# Patient Record
Sex: FEMALE | Race: WHITE
[De-identification: names, ages, dates, MRNs, and addresses within clinical notes are randomized per-mention and may not be internally consistent; named-entity substitution may affect disease eponyms.]

---

## 2022-07-09 ENCOUNTER — HOSPITAL ENCOUNTER (OUTPATIENT)
Dept: HOSPITAL 76 - DI.S | Age: 82
Discharge: HOME | End: 2022-07-09
Attending: EMERGENCY MEDICINE
Payer: MEDICARE

## 2022-07-09 DIAGNOSIS — M54.59: Primary | ICD-10-CM

## 2022-07-09 NOTE — XRAY REPORT
PROCEDURE:  Sacrum/Coccyx

 

INDICATIONS:  LOW BACK PAIN

 

TECHNIQUE:  3 views of the sacrum and coccyx acquired.  

 

COMPARISON:  None

 

FINDINGS:  

 

Bones:  No fractures or dislocations.  No suspicious bony lesions.  

 

Soft tissues:  Visualized bowel gas pattern is normal.  No suspicious soft tissue densities.  Moderat
e colonic stool.

 

IMPRESSION:  

No visualized acute fracture or dislocation. However, occult injury cannot be excluded. Recommend amanda
rt interval imaging follow-up in 7-10 days as clinically indicated for additional evaluation.

 

Reviewed by: Erica Murphy MD on 7/9/2022 6:25 PM PDT

Approved by: Erica Murphy MD on 7/9/2022 6:25 PM PDT

 

 

Station ID:  [id number]